# Patient Record
Sex: FEMALE | Race: WHITE
[De-identification: names, ages, dates, MRNs, and addresses within clinical notes are randomized per-mention and may not be internally consistent; named-entity substitution may affect disease eponyms.]

---

## 2021-05-14 ENCOUNTER — HOSPITAL ENCOUNTER (OUTPATIENT)
Dept: HOSPITAL 5 - US | Age: 36
Discharge: HOME | End: 2021-05-14
Attending: SURGERY
Payer: MEDICAID

## 2021-05-14 DIAGNOSIS — K80.20: Primary | ICD-10-CM

## 2021-05-14 PROCEDURE — 76700 US EXAM ABDOM COMPLETE: CPT

## 2021-05-14 NOTE — ULTRASOUND REPORT
US abdomen complete



INDICATION:

ABDOMINAL PAIN



COMPARISON:

None.



FINDINGS:

Pancreas: Normal.

Abdominal aorta: Normal.

IVC: Normal.

Liver: Normal.

Gallbladder: There are stones within the gallbladder without gallbladder wall thickening or perichole
cystic fluid. Sonographer reports a negative Anderson's sign.

Bile ducts: Normal. The common bile duct measures 2 mm.

Kidneys: Normal.

Spleen: Normal.

There is no free fluid in the abdomen.



IMPRESSION:

Cholelithiasis without evidence of cholecystitis.

 



Signer Name: Hector Caicedo MD 

Signed: 5/14/2021 12:23 PM

Workstation Name: [id number]

## 2021-06-09 LAB
HCT VFR BLD CALC: 34.2 % (ref 30.3–42.9)
HGB BLD-MCNC: 11.2 GM/DL (ref 10.1–14.3)
MCHC RBC AUTO-ENTMCNC: 33 % (ref 30–34)
MCV RBC AUTO: 80 FL (ref 79–97)
PLATELET # BLD: 346 K/MM3 (ref 140–440)
RBC # BLD AUTO: 4.29 M/MM3 (ref 3.65–5.03)

## 2021-06-11 ENCOUNTER — HOSPITAL ENCOUNTER (OUTPATIENT)
Dept: HOSPITAL 5 - OR | Age: 36
Discharge: HOME | End: 2021-06-11
Attending: SURGERY
Payer: MEDICAID

## 2021-06-11 VITALS — SYSTOLIC BLOOD PRESSURE: 123 MMHG | DIASTOLIC BLOOD PRESSURE: 60 MMHG

## 2021-06-11 DIAGNOSIS — Z87.440: ICD-10-CM

## 2021-06-11 DIAGNOSIS — K80.10: Primary | ICD-10-CM

## 2021-06-11 DIAGNOSIS — Z98.890: ICD-10-CM

## 2021-06-11 DIAGNOSIS — Z98.51: ICD-10-CM

## 2021-06-11 DIAGNOSIS — Z20.822: ICD-10-CM

## 2021-06-11 DIAGNOSIS — Z86.2: ICD-10-CM

## 2021-06-11 DIAGNOSIS — Z79.899: ICD-10-CM

## 2021-06-11 PROCEDURE — 85027 COMPLETE CBC AUTOMATED: CPT

## 2021-06-11 PROCEDURE — 84703 CHORIONIC GONADOTROPIN ASSAY: CPT

## 2021-06-11 PROCEDURE — U0003 INFECTIOUS AGENT DETECTION BY NUCLEIC ACID (DNA OR RNA); SEVERE ACUTE RESPIRATORY SYNDROME CORONAVIRUS 2 (SARS-COV-2) (CORONAVIRUS DISEASE [COVID-19]), AMPLIFIED PROBE TECHNIQUE, MAKING USE OF HIGH THROUGHPUT TECHNOLOGIES AS DESCRIBED BY CMS-2020-01-R: HCPCS

## 2021-06-11 PROCEDURE — 47562 LAPAROSCOPIC CHOLECYSTECTOMY: CPT

## 2021-06-11 PROCEDURE — 36415 COLL VENOUS BLD VENIPUNCTURE: CPT

## 2021-06-11 PROCEDURE — 88304 TISSUE EXAM BY PATHOLOGIST: CPT

## 2021-06-11 NOTE — SHORT STAY SUMMARY
Short Stay Documentation


Date of service: 06/11/21





- History


Principal diagnosis: symptomatic cholelithiasis


H&P: obtained from office





- Allergies and Medications


Current Medications: 


                                    Allergies





No Known Allergies Allergy (Verified 06/08/21 11:29)


   





                                Home Medications











 Medication  Instructions  Recorded  Confirmed  Last Taken  Type


 


No Known Home Medications [No  06/08/21 06/08/21 Unknown History





Reported Home Medications]     








Active Medications





Acetaminophen (Acetaminophen 500 Mg Tab)  1,000 mg PO PREOP CAMERON


   Last Admin: 06/11/21 06:52 Dose:  1,000 mg


   Documented by: 


Cefazolin Sodium (Cefazolin/Sterile Water 2 Gm/20 Ml Syringe)  2 gm IV PREOP NR


   Stop: 06/11/21 11:00


Celecoxib (Celecoxib 200 Mg Cap)  200 mg PO PREOP NR


   Stop: 06/11/21 23:01


   Last Admin: 06/11/21 06:52 Dose:  200 mg


   Documented by: 


Gabapentin (Gabapentin 300 Mg Cap)  300 mg PO PREOP NR


   Stop: 06/11/21 23:01


   Last Admin: 06/11/21 06:52 Dose:  300 mg


   Documented by: 


Hydromorphone HCl (Hydromorphone 1 Mg/1 Ml Inj)  0.5 mg IV Q10MIN PRN


   PRN Reason: Pain , Severe (7-10)


   Stop: 06/11/21 23:00


Lactated Ringer's (Lactated Ringers)  1,000 mls @ 100 mls/hr IV AS DIRECT CAMERON


   Stop: 06/11/21 23:59


   Last Admin: 06/11/21 07:00 Dose:  100 mls/hr


   Documented by: 


Midazolam HCl (Midazolam 2 Mg/2 Ml Inj)  2 mg IV PREOP NR


   Stop: 06/11/21 23:01


Ondansetron HCl (Ondansetron 4 Mg/2 Ml Inj)  4 mg IV ONCE PRN


   PRN Reason: Nausea And Vomiting


   Stop: 06/11/21 16:00


Oxycodone/Acetaminophen (Oxycodone /Acetaminophen 5-325mg Tab)  1 tab PO ONCE 

PRN


   PRN Reason: Pain, Moderate (4-6)


   Stop: 06/11/21 20:00


Scopolamine (Scopolamine Transdermal Patch 72 Hr)  1 each TD PREOP NR


   Stop: 06/11/21 23:01


   Last Admin: 06/11/21 06:53 Dose:  1 each


   Documented by: 











- Brief post op/procedure progress note


Date of procedure: 06/11/21


Pre-op diagnosis: symptomatic cholelithiasis


Post-op diagnosis: same


Procedure: 





lap cholecystectomy


Anesthesia: GETA, local


Findings: 





distended gallbladder with 3.5 cm stone


Surgeon: MINE MCKEON


Assistant: JAIMEE SMITH


Estimated blood loss: minimal


Pathology: list (gallbladder)


Specimen disposition: to lab


Condition: stable





- Hospital course


Hospital course: 





Pt recovered in PACU and discharged to home in stable condition when criteria 

met





- Disposition


Condition at discharge: Good


Disposition: DC-01 TO HOME OR SELFCARE





Short Stay Discharge Plan


Activity: other (no driving if taking prescription pain medications)


Diet: regular


Wound: open to air, per your surgeon's advice


Additional Instructions: 


SEE PRINTED DISCHARGE INSTRUCTIONS





TAKE IBUPROFEN OR TYLENOL FOR MILD TO MODERATE PAIN.


Follow up with: 


POLLO WADDELL MD [Primary Care Provider] - 7 Days


MINE MCKEON DO [Staff Physician] - 14 Days


Prescriptions: 


HYDROcodone/APAP 5-325 [[place] 5/325] 1 each PO Q4HR PRN #20 tablet


 PRN Reason: Pain , Severe (7-10)

## 2021-06-11 NOTE — ANESTHESIA CONSULTATION
Anesthesia Consult and Med Hx


Date of service: 06/11/21





- Airway


Anesthetic Teeth Evaluation: Good


ROM Head & Neck: Adequate


Mental/Hyoid Distance: Adequate


Mallampati Class: Class I


Intubation Access Assessment: Good





- Pulmonary Exam


CTA: Yes





- Cardiac Exam


Cardiac Exam: RRR





- Pre-Operative Health Status


ASA Pre-Surgery Classification: ASA1


Proposed Anesthetic Plan: General





- Pulmonary


Hx Smoking: No


Hx Sleep Apnea: No (ROMANA PRE SCREEN NEGATIVE)





- Cardiovascular System


Hx Hypertension: No





- Central Nervous System


Hx Psychiatric Problems: No





- Hematic


Hx Anemia: Yes (NOT RECENT)





- Other Systems


Hx Cancer: No

## 2021-06-11 NOTE — OPERATIVE REPORT
Operative Report


Operative Report: 





Date of procedure: 06/11/21


Pre-op diagnosis: symptomatic cholelithiasis


Post-op diagnosis: same


Procedure: 


lap cholecystectomy


Anesthesia: GETA, local


Findings: 


distended gallbladder with 3.5 cm stone


Surgeon: MINE MCKEON


Assistant: JAIMEE SMITH


Estimated blood loss: minimal


Pathology: list (gallbladder)


Specimen disposition: to lab


Condition: stable


Hospital course: 


Pt recovered in PACU and discharged to home in stable condition when criteria 

met








HPI an indication: 36-year-old female who presented to the surgery clinic with 

complaints of intermittentupper quadrant abdominal pain.  Patient found to have 

gallstones on imaging  It was recommended that the patient undergo 

cholecystectomy.  All risks, benefits, alternatives to surgery were discussed in

detail and questions answered.  Consent was obtained for laparoscopic, possible 

open cholecystectomy, possible cholangiogram. 





Procedure in detail: The patient was identified in the preoperative area and 

taken back to the operating room, placed on the operating room table in supine 

position.  After anesthesia was induced, the abdomen was prepped and draped in 

usual sterile fashion and timeout was performed.  Local anesthetic was 

infiltrated into all of the skin incision sites.  Using an 11 blade, a 

supraumbilical incision was made through which a Veress needle was inserted.  

The position of the veress needle was confirmed with the saline drop test and 

the abdomen was then insufflated to 15 mmHg without incident.  The veress needle

was then removed and a 5 mm Optiview trocar placed through this incision.  The 

abdomen was then inspected and there was no underlying injury to any of the 

abdominal contents. An additional 12 mm subxyphoid port, and 2, 5mm RUQ ports 

were then placed under direct visualization. The patient was then placed into 

reverse Trendelberg and tilted to the left.  The gallbladder was visualized and 

appeared distended.  The gallbladder fundus was grasped and retracted cephalad a

nd above the liver.  Adequate retraction could not be achieved and therefore it 

was decided to start with a dome down approach.  The peritoneum was scored 

between the gallbladder and the liver and dissection carried out in an avascular

plane in order to separate the gallbladder from the liver.  This was carried out

for a few centimeters and then the gallbladder retracted above the liver.  

Retraction was better and the gallbladder neck was able to be visualized easily.

 The infundibulum was grasped and retracted laterally.  Using [place] dissector

overlying fat was dissected bluntly. The cystic duct and artery were then 

carefully dissected.  Once skeletonized, it was apparent that the cystic duct 

and artery were the only 2 structures entering the gallbladder.  The critical 

view was successfully obtained.  3 clips were placed on the proximal aspect of 

the cystic duct and 1 distally, and 2 clips proximally on the cystic artery and 

one distally.  The structures were transected in between the clips using 

EndoShears.  The gallbladder was then dissected off the liver bed using hook 

electrocautery.  Hemostasis was achieved along the way.  The gallbladder was 

placed into an Endo Catch bag and removed via the 12 mm port.  A 3.5 cm stone 

was palpable in the gallbladder.  The liver bed and gallbladder fossa were then 

inspected and there was no bleeding or bile leakage identified.  The clips on 

the cystic artery and duct were visualized and intact.





The patient was placed in neutral position.  The 12 mm port fascia was closed 

with interrupted 0 Vicryl sutures using the Maurice Mei device x2.  The 

remainder of the ports removed under direct visualization the abdomen 

desufflated.  The skin incisions were once again infiltrated with local 

anesthetic.  The skin was approximated using 4-0 Monocryl subcuticular stitches 

and skin glue.





At the end case all sponge, instrument, sharp counts were correct 2.  The 

patient was awoken from anesthesia, extubated, and taken to PACU in stable 

condition.





Patient's  was updated.

## 2024-05-27 ENCOUNTER — CLAIMS CREATED FROM THE CLAIM WINDOW (OUTPATIENT)
Dept: URBAN - METROPOLITAN AREA MEDICAL CENTER 16 | Facility: MEDICAL CENTER | Age: 39
End: 2024-05-27
Payer: COMMERCIAL

## 2024-05-27 DIAGNOSIS — R74.8 ABNORMAL ALKALINE PHOSPHATASE TEST: ICD-10-CM

## 2024-05-27 DIAGNOSIS — R74.01 ABNORMAL/ELEVATED TRANSAMINASE (SGOT, AMINOTRANSFERASE): ICD-10-CM

## 2024-05-27 PROCEDURE — 99284 EMERGENCY DEPT VISIT MOD MDM: CPT | Performed by: INTERNAL MEDICINE

## 2024-05-27 PROCEDURE — 99254 IP/OBS CNSLTJ NEW/EST MOD 60: CPT | Performed by: INTERNAL MEDICINE

## 2024-05-28 ENCOUNTER — TELEPHONE ENCOUNTER (OUTPATIENT)
Dept: URBAN - METROPOLITAN AREA CLINIC 118 | Facility: CLINIC | Age: 39
End: 2024-05-28

## 2024-06-05 ENCOUNTER — TELEPHONE ENCOUNTER (OUTPATIENT)
Dept: URBAN - METROPOLITAN AREA CLINIC 117 | Facility: CLINIC | Age: 39
End: 2024-06-05

## 2024-06-28 ENCOUNTER — OFFICE VISIT (OUTPATIENT)
Dept: URBAN - METROPOLITAN AREA CLINIC 118 | Facility: CLINIC | Age: 39
End: 2024-06-28

## 2024-06-28 ENCOUNTER — DASHBOARD ENCOUNTERS (OUTPATIENT)
Age: 39
End: 2024-06-28

## 2024-06-28 VITALS
DIASTOLIC BLOOD PRESSURE: 77 MMHG | SYSTOLIC BLOOD PRESSURE: 119 MMHG | BODY MASS INDEX: 36.79 KG/M2 | WEIGHT: 220.8 LBS | HEIGHT: 65 IN | HEART RATE: 80 BPM | TEMPERATURE: 97.5 F

## 2024-07-03 ENCOUNTER — TELEPHONE ENCOUNTER (OUTPATIENT)
Dept: URBAN - METROPOLITAN AREA CLINIC 118 | Facility: CLINIC | Age: 39
End: 2024-07-03

## 2024-07-15 ENCOUNTER — OFFICE VISIT (OUTPATIENT)
Dept: URBAN - METROPOLITAN AREA CLINIC 109 | Facility: CLINIC | Age: 39
End: 2024-07-15

## 2024-07-23 ENCOUNTER — TELEPHONE ENCOUNTER (OUTPATIENT)
Dept: URBAN - METROPOLITAN AREA CLINIC 118 | Facility: CLINIC | Age: 39
End: 2024-07-23

## 2024-09-11 ENCOUNTER — WEB ENCOUNTER (OUTPATIENT)
Dept: URBAN - METROPOLITAN AREA CLINIC 118 | Facility: CLINIC | Age: 39
End: 2024-09-11

## 2024-09-26 ENCOUNTER — OFFICE VISIT (OUTPATIENT)
Dept: URBAN - METROPOLITAN AREA CLINIC 118 | Facility: CLINIC | Age: 39
End: 2024-09-26

## 2024-09-26 VITALS
BODY MASS INDEX: 36.96 KG/M2 | SYSTOLIC BLOOD PRESSURE: 141 MMHG | HEIGHT: 65 IN | TEMPERATURE: 97 F | HEART RATE: 61 BPM | DIASTOLIC BLOOD PRESSURE: 82 MMHG | WEIGHT: 221.8 LBS

## 2024-09-30 ENCOUNTER — TELEPHONE ENCOUNTER (OUTPATIENT)
Dept: URBAN - METROPOLITAN AREA CLINIC 117 | Facility: CLINIC | Age: 39
End: 2024-09-30

## 2024-10-01 ENCOUNTER — TELEPHONE ENCOUNTER (OUTPATIENT)
Dept: URBAN - METROPOLITAN AREA CLINIC 118 | Facility: CLINIC | Age: 39
End: 2024-10-01

## 2025-03-26 ENCOUNTER — OFFICE VISIT (OUTPATIENT)
Dept: URBAN - METROPOLITAN AREA CLINIC 118 | Facility: CLINIC | Age: 40
End: 2025-03-26